# Patient Record
Sex: FEMALE | Race: OTHER | NOT HISPANIC OR LATINO | URBAN - METROPOLITAN AREA
[De-identification: names, ages, dates, MRNs, and addresses within clinical notes are randomized per-mention and may not be internally consistent; named-entity substitution may affect disease eponyms.]

---

## 2020-07-26 ENCOUNTER — EMERGENCY (EMERGENCY)
Facility: HOSPITAL | Age: 25
LOS: 1 days | Discharge: HOME | End: 2020-07-26
Payer: COMMERCIAL

## 2020-07-26 VITALS — HEIGHT: 57 IN | WEIGHT: 199.96 LBS

## 2020-07-26 VITALS
RESPIRATION RATE: 20 BRPM | DIASTOLIC BLOOD PRESSURE: 62 MMHG | HEART RATE: 85 BPM | TEMPERATURE: 98 F | SYSTOLIC BLOOD PRESSURE: 109 MMHG | OXYGEN SATURATION: 99 %

## 2020-07-26 DIAGNOSIS — R10.32 LEFT LOWER QUADRANT PAIN: ICD-10-CM

## 2020-07-26 LAB
ALBUMIN SERPL ELPH-MCNC: 4.6 G/DL — SIGNIFICANT CHANGE UP (ref 3.3–5)
ALP SERPL-CCNC: 58 U/L — SIGNIFICANT CHANGE UP (ref 40–120)
ALT FLD-CCNC: 24 U/L — SIGNIFICANT CHANGE UP (ref 10–45)
ANION GAP SERPL CALC-SCNC: 12 MMOL/L — SIGNIFICANT CHANGE UP (ref 5–17)
APPEARANCE UR: CLEAR — SIGNIFICANT CHANGE UP
AST SERPL-CCNC: 25 U/L — SIGNIFICANT CHANGE UP (ref 10–40)
BACTERIA # UR AUTO: NEGATIVE — SIGNIFICANT CHANGE UP
BASE EXCESS BLDV CALC-SCNC: 0 MMOL/L — SIGNIFICANT CHANGE UP (ref -2–2)
BASOPHILS # BLD AUTO: 0.08 K/UL — SIGNIFICANT CHANGE UP (ref 0–0.2)
BASOPHILS NFR BLD AUTO: 0.5 % — SIGNIFICANT CHANGE UP (ref 0–2)
BILIRUB SERPL-MCNC: 0.1 MG/DL — LOW (ref 0.2–1.2)
BILIRUB UR-MCNC: NEGATIVE — SIGNIFICANT CHANGE UP
BLD GP AB SCN SERPL QL: NEGATIVE — SIGNIFICANT CHANGE UP
BUN SERPL-MCNC: 12 MG/DL — SIGNIFICANT CHANGE UP (ref 7–23)
CALCIUM SERPL-MCNC: 9.5 MG/DL — SIGNIFICANT CHANGE UP (ref 8.4–10.5)
CHLORIDE SERPL-SCNC: 102 MMOL/L — SIGNIFICANT CHANGE UP (ref 96–108)
CO2 BLDV-SCNC: 26 MMOL/L — SIGNIFICANT CHANGE UP (ref 22–30)
CO2 SERPL-SCNC: 23 MMOL/L — SIGNIFICANT CHANGE UP (ref 22–31)
COLOR SPEC: SIGNIFICANT CHANGE UP
CREAT SERPL-MCNC: 0.74 MG/DL — SIGNIFICANT CHANGE UP (ref 0.5–1.3)
DIFF PNL FLD: NEGATIVE — SIGNIFICANT CHANGE UP
EOSINOPHIL # BLD AUTO: 0.02 K/UL — SIGNIFICANT CHANGE UP (ref 0–0.5)
EOSINOPHIL NFR BLD AUTO: 0.1 % — SIGNIFICANT CHANGE UP (ref 0–6)
EPI CELLS # UR: 2 /HPF — SIGNIFICANT CHANGE UP
GLUCOSE SERPL-MCNC: 127 MG/DL — HIGH (ref 70–99)
GLUCOSE UR QL: NEGATIVE — SIGNIFICANT CHANGE UP
HCG UR QL: NEGATIVE — SIGNIFICANT CHANGE UP
HCO3 BLDV-SCNC: 25 MMOL/L — SIGNIFICANT CHANGE UP (ref 21–29)
HCT VFR BLD CALC: 37.7 % — SIGNIFICANT CHANGE UP (ref 34.5–45)
HGB BLD-MCNC: 11.9 G/DL — SIGNIFICANT CHANGE UP (ref 11.5–15.5)
HYALINE CASTS # UR AUTO: 2 /LPF — SIGNIFICANT CHANGE UP (ref 0–2)
IMM GRANULOCYTES NFR BLD AUTO: 0.4 % — SIGNIFICANT CHANGE UP (ref 0–1.5)
INR BLD: 1.01 RATIO — SIGNIFICANT CHANGE UP (ref 0.88–1.16)
KETONES UR-MCNC: NEGATIVE — SIGNIFICANT CHANGE UP
LEUKOCYTE ESTERASE UR-ACNC: NEGATIVE — SIGNIFICANT CHANGE UP
LIDOCAIN IGE QN: 15 U/L — SIGNIFICANT CHANGE UP (ref 7–60)
LYMPHOCYTES # BLD AUTO: 1.33 K/UL — SIGNIFICANT CHANGE UP (ref 1–3.3)
LYMPHOCYTES # BLD AUTO: 8 % — LOW (ref 13–44)
MCHC RBC-ENTMCNC: 27.2 PG — SIGNIFICANT CHANGE UP (ref 27–34)
MCHC RBC-ENTMCNC: 31.6 GM/DL — LOW (ref 32–36)
MCV RBC AUTO: 86.1 FL — SIGNIFICANT CHANGE UP (ref 80–100)
MONOCYTES # BLD AUTO: 0.94 K/UL — HIGH (ref 0–0.9)
MONOCYTES NFR BLD AUTO: 5.7 % — SIGNIFICANT CHANGE UP (ref 2–14)
NEUTROPHILS # BLD AUTO: 14.12 K/UL — HIGH (ref 1.8–7.4)
NEUTROPHILS NFR BLD AUTO: 85.3 % — HIGH (ref 43–77)
NITRITE UR-MCNC: NEGATIVE — SIGNIFICANT CHANGE UP
NRBC # BLD: 0 /100 WBCS — SIGNIFICANT CHANGE UP (ref 0–0)
PCO2 BLDV: 45 MMHG — SIGNIFICANT CHANGE UP (ref 35–50)
PH BLDV: 7.36 — SIGNIFICANT CHANGE UP (ref 7.35–7.45)
PH UR: 8.5 — HIGH (ref 5–8)
PLATELET # BLD AUTO: 345 K/UL — SIGNIFICANT CHANGE UP (ref 150–400)
PO2 BLDV: 42 MMHG — SIGNIFICANT CHANGE UP (ref 25–45)
POTASSIUM SERPL-MCNC: 4.5 MMOL/L — SIGNIFICANT CHANGE UP (ref 3.5–5.3)
POTASSIUM SERPL-SCNC: 4.5 MMOL/L — SIGNIFICANT CHANGE UP (ref 3.5–5.3)
PROT SERPL-MCNC: 7.6 G/DL — SIGNIFICANT CHANGE UP (ref 6–8.3)
PROT UR-MCNC: ABNORMAL
PROTHROM AB SERPL-ACNC: 12 SEC — SIGNIFICANT CHANGE UP (ref 10.6–13.6)
RBC # BLD: 4.38 M/UL — SIGNIFICANT CHANGE UP (ref 3.8–5.2)
RBC # FLD: 14.2 % — SIGNIFICANT CHANGE UP (ref 10.3–14.5)
RBC CASTS # UR COMP ASSIST: 8 /HPF — HIGH (ref 0–4)
RH IG SCN BLD-IMP: POSITIVE — SIGNIFICANT CHANGE UP
SAO2 % BLDV: 74 % — SIGNIFICANT CHANGE UP (ref 67–88)
SODIUM SERPL-SCNC: 137 MMOL/L — SIGNIFICANT CHANGE UP (ref 135–145)
SP GR SPEC: 1.03 — HIGH (ref 1.01–1.02)
UROBILINOGEN FLD QL: NEGATIVE — SIGNIFICANT CHANGE UP
WBC # BLD: 16.55 K/UL — HIGH (ref 3.8–10.5)
WBC # FLD AUTO: 16.55 K/UL — HIGH (ref 3.8–10.5)
WBC UR QL: 1 /HPF — SIGNIFICANT CHANGE UP (ref 0–5)

## 2020-07-26 PROCEDURE — 76770 US EXAM ABDO BACK WALL COMP: CPT

## 2020-07-26 PROCEDURE — 96375 TX/PRO/DX INJ NEW DRUG ADDON: CPT

## 2020-07-26 PROCEDURE — 83690 ASSAY OF LIPASE: CPT

## 2020-07-26 PROCEDURE — 81001 URINALYSIS AUTO W/SCOPE: CPT

## 2020-07-26 PROCEDURE — 86900 BLOOD TYPING SEROLOGIC ABO: CPT

## 2020-07-26 PROCEDURE — 99243 OFF/OP CNSLTJ NEW/EST LOW 30: CPT

## 2020-07-26 PROCEDURE — 93975 VASCULAR STUDY: CPT | Mod: 26

## 2020-07-26 PROCEDURE — 96361 HYDRATE IV INFUSION ADD-ON: CPT

## 2020-07-26 PROCEDURE — 96376 TX/PRO/DX INJ SAME DRUG ADON: CPT

## 2020-07-26 PROCEDURE — 80053 COMPREHEN METABOLIC PANEL: CPT

## 2020-07-26 PROCEDURE — 87086 URINE CULTURE/COLONY COUNT: CPT

## 2020-07-26 PROCEDURE — 93975 VASCULAR STUDY: CPT

## 2020-07-26 PROCEDURE — 96374 THER/PROPH/DIAG INJ IV PUSH: CPT | Mod: XU

## 2020-07-26 PROCEDURE — 99285 EMERGENCY DEPT VISIT HI MDM: CPT

## 2020-07-26 PROCEDURE — 74177 CT ABD & PELVIS W/CONTRAST: CPT

## 2020-07-26 PROCEDURE — 76770 US EXAM ABDO BACK WALL COMP: CPT | Mod: 26,59

## 2020-07-26 PROCEDURE — 76830 TRANSVAGINAL US NON-OB: CPT

## 2020-07-26 PROCEDURE — 74177 CT ABD & PELVIS W/CONTRAST: CPT | Mod: 26

## 2020-07-26 PROCEDURE — 81025 URINE PREGNANCY TEST: CPT

## 2020-07-26 PROCEDURE — 86901 BLOOD TYPING SEROLOGIC RH(D): CPT

## 2020-07-26 PROCEDURE — 85610 PROTHROMBIN TIME: CPT

## 2020-07-26 PROCEDURE — 99284 EMERGENCY DEPT VISIT MOD MDM: CPT | Mod: 25

## 2020-07-26 PROCEDURE — 76830 TRANSVAGINAL US NON-OB: CPT | Mod: 26

## 2020-07-26 PROCEDURE — 82803 BLOOD GASES ANY COMBINATION: CPT

## 2020-07-26 PROCEDURE — 85027 COMPLETE CBC AUTOMATED: CPT

## 2020-07-26 PROCEDURE — 86850 RBC ANTIBODY SCREEN: CPT

## 2020-07-26 RX ORDER — ONDANSETRON 8 MG/1
4 TABLET, FILM COATED ORAL ONCE
Refills: 0 | Status: COMPLETED | OUTPATIENT
Start: 2020-07-26 | End: 2020-07-26

## 2020-07-26 RX ORDER — ONDANSETRON 8 MG/1
1 TABLET, FILM COATED ORAL
Qty: 9 | Refills: 0
Start: 2020-07-26 | End: 2020-07-28

## 2020-07-26 RX ORDER — SODIUM CHLORIDE 9 MG/ML
1000 INJECTION INTRAMUSCULAR; INTRAVENOUS; SUBCUTANEOUS ONCE
Refills: 0 | Status: COMPLETED | OUTPATIENT
Start: 2020-07-26 | End: 2020-07-26

## 2020-07-26 RX ORDER — MORPHINE SULFATE 50 MG/1
4 CAPSULE, EXTENDED RELEASE ORAL ONCE
Refills: 0 | Status: DISCONTINUED | OUTPATIENT
Start: 2020-07-26 | End: 2020-07-26

## 2020-07-26 RX ORDER — ONDANSETRON 8 MG/1
4 TABLET, FILM COATED ORAL ONCE
Refills: 0 | Status: DISCONTINUED | OUTPATIENT
Start: 2020-07-26 | End: 2020-07-26

## 2020-07-26 RX ORDER — KETOROLAC TROMETHAMINE 30 MG/ML
15 SYRINGE (ML) INJECTION ONCE
Refills: 0 | Status: DISCONTINUED | OUTPATIENT
Start: 2020-07-26 | End: 2020-07-26

## 2020-07-26 RX ADMIN — SODIUM CHLORIDE 1000 MILLILITER(S): 9 INJECTION INTRAMUSCULAR; INTRAVENOUS; SUBCUTANEOUS at 09:50

## 2020-07-26 RX ADMIN — MORPHINE SULFATE 4 MILLIGRAM(S): 50 CAPSULE, EXTENDED RELEASE ORAL at 09:40

## 2020-07-26 RX ADMIN — SODIUM CHLORIDE 1000 MILLILITER(S): 9 INJECTION INTRAMUSCULAR; INTRAVENOUS; SUBCUTANEOUS at 10:50

## 2020-07-26 RX ADMIN — MORPHINE SULFATE 4 MILLIGRAM(S): 50 CAPSULE, EXTENDED RELEASE ORAL at 10:14

## 2020-07-26 RX ADMIN — ONDANSETRON 4 MILLIGRAM(S): 8 TABLET, FILM COATED ORAL at 09:50

## 2020-07-26 RX ADMIN — ONDANSETRON 4 MILLIGRAM(S): 8 TABLET, FILM COATED ORAL at 09:44

## 2020-07-26 RX ADMIN — Medication 15 MILLIGRAM(S): at 13:15

## 2020-07-26 RX ADMIN — MORPHINE SULFATE 4 MILLIGRAM(S): 50 CAPSULE, EXTENDED RELEASE ORAL at 10:37

## 2020-07-26 RX ADMIN — MORPHINE SULFATE 4 MILLIGRAM(S): 50 CAPSULE, EXTENDED RELEASE ORAL at 11:33

## 2020-07-26 RX ADMIN — ONDANSETRON 4 MILLIGRAM(S): 8 TABLET, FILM COATED ORAL at 17:28

## 2020-07-26 RX ADMIN — Medication 15 MILLIGRAM(S): at 12:07

## 2020-07-26 NOTE — CONSULT NOTE ADULT - ASSESSMENT
23yo  LMP 2020 w/ LLQ pain and vomiting suspicious for ovarian torsion vs hemorrhagic cyst, vital signs wnl, WBC 16 23yo  LMP 2020 w/ LLQ pain and vomiting suspicious for ovarian torsion vs hemorrhagic cyst vs GI etiology, vital signs wnl, WBC 16

## 2020-07-26 NOTE — ED ADULT NURSE NOTE - OBJECTIVE STATEMENT
Patient   is  alert  and  oriented  x3.  Color  is  good  and  skin  warm  to touch.  She  is  c/o  left  flank  pain, nausea  and  vomiting.   She  denies  dysuria  or  fever.

## 2020-07-26 NOTE — ED ADULT NURSE NOTE - NSIMPLEMENTINTERV_GEN_ALL_ED
Implemented All Universal Safety Interventions:  [place] to call system. Call bell, personal items and telephone within reach. Instruct patient to call for assistance. Room bathroom lighting operational. Non-slip footwear when patient is off stretcher. Physically safe environment: no spills, clutter or unnecessary equipment. Stretcher in lowest position, wheels locked, appropriate side rails in place.

## 2020-07-26 NOTE — ED PROVIDER NOTE - DATE/TIME 3
ED Visit Note


First contact with patient:  19:46


CHIEF COMPLAINT:  Low back pain





HISTORY OF PRESENT ILLNESS:  This 31-year-old female patient presents to the 

emergency department via private vehicle complaining of pain in the low back 

which began earlier today while at home after lifting heavy objects.  The pain 

was gradual in onset, is now constant and worse with movement.  The patient 

notes the pain as sharp and radiating down both posterior legs and a 9/10.  The 

patient has taken Flexeril, to Tylenol, additional Flexeril, ibuprofen and 

heating pad without relief of the pain.  The patient denies any loss of control 

of their bowel or bladder functions.  There has been no leg numbness or weakness

, and no change in sensation.  No nausea or vomiting or abdominal pain.  No 

chest pain or shortness of breath.  The patient has had prior back injuries.  

No dysuria or increased urinary frequency.





REVIEW OF SYSTEMS:   A review of systems was performed with positives and 

pertinent negatives listed in the history of present illness. All other systems 

were reviewed and are negative.





ALLERGIES: As noted below





MEDICATIONS: As noted below





PMH: Previous back disc bulge/injury





SOCIAL HISTORY:    Patient lives locally


  


PHYSICAL EXAM: 


VITALS: Vitals are noted on the nurse's note and reviewed by myself.  Vital 

signs stable.


GENERAL: 31-year-old female, in no acute distress, nondiaphoretic, well-

developed well-nourished.


SKIN: The skin was without rashes, erythema, edema, or bruising. 


ABDOMEN: Soft, nontender, without masses or organomegaly.


MUSCULOSKELETAL: No muscle atrophy, erythema, or edema noted of the back.  

There is minimal tenderness over the lumbar spinous processes.  There is 

positive tenderness over the paraspinous muscles of the lumbar spine.  There is 

no tenderness over the thoracic spine or paraspinous muscles.  There are no 

muscle spasms present. 


NEURO: Patient was alert and oriented to person place and time.  Normal 

sensation to light and sharp touch.  Deep tendon reflexes 2+ in the lower 

extremities.   Strength 5/5 and equal in the bilateral lower extremities.





EMERGENCY DEPARTMENT COURSE: Patient was seen and evaluated as above.  Vital 

signs stable.  She is nontoxic on exam.  She is able to ambulate.  She has back 

pain that began after heavy lifting.  She has a known disc bulge at L4-L5/

history of that.  I do not believe that imaging is warranted secondary to the 

being no trauma.  No evidence of cauda equina on exam.  She is already tried 

numerous over-the-counter medications and Flexeril without relief.  She was 

given morphine IM here and a prescription for oxycodone.  I was going to 

provide her more Flexeril but given her other medications do not want to cause 

interaction.  She will not be driving at this time.  Risks regarding 

medications were discussed.  She no longer is taking the contrave.  Patient is 

to follow with her family doctor or return with worsening.  She was educated 

upon management, educated upon worrisome symptoms which to return, had 

questions in spite of discharge, and was discharged home in good condition.  

She will also be given a short course of steroids Via Medrol Dosepak which the 

patient notes she can tolerate but not prednisone.





In the evaluation and treatment of this patient the following differential 

diagnoses were obtained: Fracture, dislocation, cauda equina syndrome, acute 

abdomen, UTI, pregnancy, among others.


Current/Historical Medications


Scheduled


Aripiprazole (Abilify), 5 MG PO DAILY


Diltiazem Hcl Coated Beads (Cartia Xt), 240 MG PO DAILY


Escitalopram Oxalate (Lexapro), 20 MG PO DAILY


Fluticasone Propionate (Flovent Hfa), 2 PUFFS INH BID


Topiramate (Topamax ), 25 MG PO BID





Scheduled PRN


Albuterol Hfa (Ventolin Hfa), 2 PUFFS INH Q4H PRN for SOB/Wheezing


Fluticasone Propionate (Nasal) (Flonase Allergy Relief), 2 SPRAYS SHANITA DAILY PRN 

for Nasal Congestion


Loratadine (Claritin), 10 MG PO DAILY PRN for Allergy Symptoms


Lorazepam (Ativan), 0.5 MG PO DAILY PRN for Prior to Dental Appointment





Allergies


Coded Allergies:  


     Cephalexin (Verified  Allergy, Unknown, hives, 5/24/18)


     Prednisone (Verified  Allergy, Unknown, SVT, 5/24/18)





Vital Signs











  Date Time  Temp Pulse Resp B/P (MAP) Pulse Ox O2 Delivery O2 Flow Rate FiO2


 


5/24/18 20:56  105  141/94 96   


 


5/24/18 19:41 36.7 94 18 132/92 97 Room Air  











Medications Administered











 Medications


  (Trade)  Dose


 Ordered  Sig/Susan


 Route  Start Time


 Stop Time Status Last Admin


Dose Admin


 


 Morphine Sulfate


  (MoRPHine


 SULFATE INJ)  10 mg  NOW  STAT


 IM  5/24/18 19:54


 5/24/18 19:56 DC 5/24/18 20:07


10 MG


 


 Oxycodone HCl


  (Roxicodone


 Immediate Rel 5MG


 Home Pack)  1 homepack  UD  STAT


 PO  5/24/18 19:54


 5/24/18 19:56 DC 5/24/18 20:43


1 HOMEPACK











Departure Information


Impression





 Primary Impression:  


 Low back pain





Dispostion


Home / Self-Care





Condition


GOOD





Referrals


Donald Viveros M.D. (PCP)





Patient Instructions


My [facility]





Additional Instructions





You have been treated in the Emergency Department for Back Pain. You have 

received pain medicine in the emergency department which impairs your ability 

to operate a vehicle. It is illegal for you to drive after receiving these 

medicines. 





You have been prescribed Oxy IR to be used for pain control. This is a narcotic 

medication. You cannot drive or consume alcohol while on this medicine. This 

medicine should only be used for pain that cannot be controlled with over-the-

counter pain medicines.





You have been prescribed a Medrol Dosepak. Take this medication as prescribed. 

You should take the COMPLETE 6-day course of this medication. This is an anti-

inflammatory medicine that will help to minimize your symptoms.





For pain control, you can use the following over-the-counter medicines (if >11 yo):





- Regular strength (325mg/tab) Tylenol (acetaminophen) 2 tabs every 4-6 hours 

as needed. Do not exceed 12 tablets in a 24 hour period. Avoid taking more than 

3 grams (3000 mg) of Tylenol per day. This includes any other sources of 

acetaminophen you may take on a regular basis.





- Regular strength (200 mg/tab) Advil (ibuprofen) 1-2 tabs every 4-6 hours as 

needed. Do not exceed a dose of 3200 mg per day.





If this is an acute injury, ice can be applied to the area of pain for the 

first 3 days to help decrease pain and inflammation. After the first 3 days, a 

heating pad can be used over the area for continued soothing relief.





You should schedule a follow-up appointment in 2-3 days with your Primary Care 

Provider for further evaluation and treatment of your back pain.





Return to the Emergency Department if your current symptoms worsen despite 

treatment course outlined above, or if you develop any of the following symptoms

: intractable pain despite aforementioned treatment course, loss of control of 

your bowel or bladder, numbness or tingling in your groin, or development of a 

fever. 26-Jul-2020 18:15

## 2020-07-26 NOTE — ED PROVIDER NOTE - PATIENT PORTAL LINK FT
You can access the FollowMyHealth Patient Portal offered by [facility] by registering at the following website: [email or web address]. By joining [employer]’s FollowMyHealth portal, you will also be able to view your health information using other applications (apps) compatible with our system.

## 2020-07-26 NOTE — ED PROVIDER NOTE - CLINICAL SUMMARY MEDICAL DECISION MAKING FREE TEXT BOX
25 yo F no significant pmh presents with 8 hours of acute onset, constant LLQ abdominal pain refractory to pain medication associated with n/v. There is no surgical history, associated with N/v, no fever/chills. L adnexal pain on bimanual exam, vitals stable. Based on character of pain and onset, there is high suspicion for ovarian torsion. We will obtain presurgical labs, pregnancy test, and transvaginal ultrasound. Pain control early, manage N/V. Must also consider  ectopic pregnancy, renal colic, rupture ovarian cyst. Will continue work-up following u/s. OB consulted.

## 2020-07-26 NOTE — CONSULT NOTE ADULT - SUBJECTIVE AND OBJECTIVE BOX
GYN Consult Note    24y  LMP 2020 presents with LLQ pain which started suddenly at 2-3am this morning.  Patient states the pain woke her from sleep and has been constant since.  At worst the pain is 9/10.  It radiates to the left lower back/flank.  Pain was temporarily relieved with tylenol/motrin at home, and patient had transient relief with morphine in emergency department.  Since pain began she had 6 episodes of vomiting.  She is not currently nauseas.  Has not eaten since 7pm last night.  Last bowel movement yesterday. Denies headaches/chest pain/SOB/fevers/chills.    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  She says this pain is similar to pain she experienced during that time.      Last Menstrual Period , periods are regular cycle lasting for 2 days    Name of GYN Physician: [facility]  Date of Last Pap:   Current OCP use: denies     PAST MEDICAL & SURGICAL HISTORY:  Anxiety  Depression  Inpatient psychiatric hospitalization [id number]      REVIEW OF SYSTEMS  General: denies fevers, chills, tiredness  Skin/Breast: denies breast pain  Respiratory and Thorax: denies shortness of breath, denies cough  Cardiovascular: denies chest pain and denies palpitations  Gastrointestinal: refer to HPI	  Genitourinary: denies dysuria, increased urinary frequency, urgency	  Constitutional, Cardiovascular, Respiratory, Gastrointestinal, Genitourinary, Musculoskeletal and Integumentary review of systems are normal except as noted. 	    MEDICATIONS  (STANDING):    MEDICATIONS  (PRN):      Allergies  Wellbutrin XL (Rash)      SOCIAL HISTORY:  Patient currently sexually active with Females only, in past year has 1 male and 2 female partners      Vital Signs Last 24 Hrs  T(C): 37.2 (2020 09:29), Max: 37.2 (2020 09:29)  T(F): 99 (2020 09:29), Max: 99 (2020 09:29)  HR: 86 (2020 09:29) (86 - 86)  BP: 135/82 (2020 09:29) (135/82 - 135/82)  BP(mean): --  RR: 19 (2020 09:29) (19 - 19)  SpO2: 98% (2020 09:29) (98% - 98%)    PHYSICAL EXAM:   Gen: uncomfortable in pain, alert and oriented x 3  Cardiovascular: RRR  Respiratory: breathing comfortably on RA  Abd: soft, non distended, tender to deep palpation in LLQ, guarding and no rebound tenderness in LLQ, non tender in LUQ/RUQ/RLQ  Pelvic: closed/long, no CMT, Uterus: normal size, non tender  Adnexa: left adnexal tenderness, possible palpable mass in left adnexa with exam limited by patient discomfort  Extremities: NTBL  Skin: warm and well perfused      LABS:                        11.9   16.55 )-----------( 345      ( 2020 09:44 )             37.7     07-26    137  |  102  |  12  ----------------------------<  127<H>  4.5   |  23  |  0.74    Ca    9.5      2020 09:44    TPro  7.6  /  Alb  4.6  /  TBili  0.1<L>  /  DBili  x   /  AST  25  /  ALT  24  /  AlkPhos  58  07-26    PT/INR - ( 2020 10:25 )   PT: 12.0 sec;   INR: 1.01 ratio           Urinalysis Basic - ( 2020 09:44 )    Color: Light Yellow / Appearance: Clear / S.027 / pH: x  Gluc: x / Ketone: Negative  / Bili: Negative / Urobili: Negative   Blood: x / Protein: Trace / Nitrite: Negative   Leuk Esterase: Negative / RBC: 8 /hpf / WBC 1 /HPF   Sq Epi: x / Non Sq Epi: 2 /hpf / Bacteria: Negative        RADIOLOGY & ADDITIONAL STUDIES:

## 2020-07-26 NOTE — ED PROVIDER NOTE - NSFOLLOWUPINSTRUCTIONS_ED_ALL_ED_FT
You were evaluated today to abdominal pain likely caused by a passed kidney stone. The results of our imaging studies as well as the lab work are included within this packet. Please be sure to drink plenty of fluids over the next 24-48 hours.     Return to the hospital if you develop any fever, new or worsening pain, or any additional urinary symptoms. We will send some medication to help with nausea for the next three days, please only take if you are symptomatic.

## 2020-07-26 NOTE — ED ADULT NURSE REASSESSMENT NOTE - GENERAL PATIENT STATE
comfortable appearance/cooperative/improvement verbalized/family/SO at bedside
improvement verbalized/no change observed/comfortable appearance/cooperative

## 2020-07-26 NOTE — CONSULT NOTE ADULT - PROBLEM SELECTOR RECOMMENDATION 9
-pregnancy test  -patient for TVUS  -will follow up imaging  -pain management by ED at this time    Seen w/ Dr. Katalina Rascon, PGY2 -pregnancy test  -patient for TVUS  -will follow up imaging  -pain management by ED at this time    Seen w/ Dr. Katalina Rascon, PGY2    Addendum  Review of imaging shows no sign of ovarian torsion  Patient with clinical improvement s/p Toradol administration  Patient examined at bedside and etiology of transient abdominal pain discussed  No acute GYN intervention at this time, patient ok for discharge  Return precautions provided and patient instructed to follow up with her primary GYN physician for annual examination or reschedule for sooner  Appreciate ED care    Examined w/ Dr. Sandoval and Dr. Katalina Rascon, PGY2

## 2020-07-26 NOTE — ED PROVIDER NOTE - PROGRESS NOTE DETAILS
MD Alcaraz:  patient req 2nd dose morphine, OB Gyn team (intern and chief) at bedside to perform their own exam, transport here to take to US. Transvaginal ultrasound was unremarkable, L ovary has normal flow. Renal ultrasound was also unremarkable. A conversation was had with the patient regarding the likelihood that she had passed a renal stone which would no longer be visualized on our imaging modalities. After a lengthy discussion with the patient regarding risks of radiation exposure, pt expressed desire for further imaging. Abd CT with IV contrast demonstrated signs of a passed renal stone. Pt is improved, pain free. Feels comfortable and able to go home. Jessica CORCORAN: Received sign out from my colleague Dr. Alcaraz pt presents with L abdominal pain initally c/f torsion s/p TVUS and OB eval now pending CT, noted to have perinephritic fluid c/w possible recently passed L renal stone. Pt assessed at beside. Pt resting comfortably, pain controlled, pt questions answered. Vital signs stable. Pt reassessed at bedside, feels well, pain controlled. Informed of workup in ED, reviewed lab and/or radiology results (when applicable) with patient/caregiver. Informed pt of plan for discharge with instructions to follow up with PMD. Pt/caregiver expressed understanding of plan and agrees with plan for discharge. Strict return precautions discussed with patient in layman's terms, patient demonstrated understanding of return precautions.

## 2020-07-26 NOTE — CONSULT NOTE ADULT - ATTENDING COMMENTS
ATTG note    Pt seen with and agree with above PGY2 note    Pt is a 23 yo P0 LMP 7/21 presented with acute onset LLQ pain that started 3AM that woke pt up.  NO inciting events.  SExually active with same sex partner at this time.  GYN h/o CT and treated for presumed PID.  Given Morphine in ED and s/p Toradol.  When pt seen by me pain significantly improved.    VSS, afebrile  Gen-NAD  ABd-obese, soft, nd, no rebound, no guarding    Labs-WBC 16  TVS-normal adnexa, no FF, no evidence of torsion  Renal U/S-no nephrolethiasis    23 yo P0 with sudden onset LLQ pain now resolved with anitinflammatory.  No pelvic path on u/s.  Clinically stable from GYN standpoint.  -f/u with GYN - [facility] for routine care  -ED precautions    ELVIN Sandoval

## 2020-07-26 NOTE — ED PROVIDER NOTE - ATTENDING CONTRIBUTION TO CARE
MD Alcaraz:  patient seen and evaluated with the resident.  I was present for key portions of the History & Physical, and I agree with the Impression & Plan.  MD Alcaraz:  23 yo F, not pregnant by ED Upreg, c/o LLQ and L lower flank pain.  Onset:  suddenly at 3am; woke her from sleep.  Intensity: 10/10; intermittent.  Associated Sx:  nausea with the waves of severe pain, no dysuria, no frequency, no f/c, no diarrhea.  VS: wnl.  Physical Exam: adult F, obese, uncomfortable appearing, NCAT, PERRL, EOMI, neck supple, RRR, CTA B, Abd: s/nd/+LLQ pain but much more severe on pelvic than on transabdominal palpation. Ext: no edema, Neuro: AAOx3, ambulates w/o diff, strength 5/5 & symmetric throughout.  Pelvic:  +L adnexal TTP, no discharge, small lesion on cervix at 11 o-clock position.  Impression:  DDx includes L ovarian torsion, cyst, >> renal colic.    Plan:  TV US (spoke with radiology US to expedite - currently another patient getting same study [torsion/ectopic r/o]), labs, pain meds, UA.

## 2020-07-26 NOTE — ED ADULT NURSE REASSESSMENT NOTE - COMFORT CARE
warm blanket provided/side rails up/treatment delay explained/wait time explained/plan of care explained
side rails up/treatment delay explained/wait time explained/warm blanket provided/plan of care explained

## 2020-07-26 NOTE — ED PROVIDER NOTE - OBJECTIVE STATEMENT
25yo F PMH of MDD, cardiac ablation presents for 8 hrs of LLQ pain. Pain woke pt out of sleep last night, has been constant since, described as sharp 9/10 in intensity with radiation to the left flank. Pain is associated with Nausea, 4 episodes of nb/nb vomiting, 25yo F PMH of MDD, cardiac ablation presents for 8 hrs of LLQ pain. Pain woke pt out of sleep last night, has been constant since, described as sharp 9/10 in intensity with radiation to the left flank, non-positional. Pain is associated with Nausea, 4 episodes of nb/nb vomiting, not alleviated by advil or tylenol. Last bowel movement last night, normal color/soft. No recent new sexual contact, dysuria, vaginal discharge or hematuria. LMP just concluded, normal cycle 2 days without significant cramping. No fever, chest pain, shortness of breath, lower extremity edema.

## 2020-07-27 LAB
CULTURE RESULTS: SIGNIFICANT CHANGE UP
CULTURE RESULTS: SIGNIFICANT CHANGE UP
SPECIMEN SOURCE: SIGNIFICANT CHANGE UP
SPECIMEN SOURCE: SIGNIFICANT CHANGE UP

## 2020-09-08 NOTE — ED PROCEDURE NOTE - ATTENDING CONTRIBUTION TO CARE
MD Alcaraz:  patient seen and evaluated with the PA.  I agree with the above procedure note.  Dr. Jaramillo present for procedure as well.

## 2021-10-08 NOTE — ED ADULT TRIAGE NOTE - NSTRIAGECARE_GEN_A_ER
Medication:    Outpatient Medications Marked as Taking for the 10/8/21 encounter (Refill) with Jono Leiva MD   Medication Sig Dispense Refill   • amphetamine-dextroamphetamine (Adderall) 5 MG tablet Take 1 tablet by mouth daily (at noon). 30 tablet 0       Message to Prescriber: aliya    [x] Pharmacy has been verified.    [] Patient completely out of medication (*Route encounter as high priority if checked)    [x] Patient informed refill request can take up to 5 business days to be processed    Patient currently has follow up appointment scheduled       Face Mask